# Patient Record
Sex: FEMALE | Race: WHITE | NOT HISPANIC OR LATINO | Employment: FULL TIME | URBAN - NONMETROPOLITAN AREA
[De-identification: names, ages, dates, MRNs, and addresses within clinical notes are randomized per-mention and may not be internally consistent; named-entity substitution may affect disease eponyms.]

---

## 2018-06-13 ENCOUNTER — PREP FOR SURGERY (OUTPATIENT)
Dept: OTHER | Facility: HOSPITAL | Age: 43
End: 2018-06-13

## 2018-06-13 DIAGNOSIS — R10.2 PELVIC PAIN: Primary | ICD-10-CM

## 2018-06-13 RX ORDER — SODIUM CHLORIDE 0.9 % (FLUSH) 0.9 %
1-10 SYRINGE (ML) INJECTION AS NEEDED
Status: CANCELLED | OUTPATIENT
Start: 2018-06-19

## 2018-06-14 ENCOUNTER — APPOINTMENT (OUTPATIENT)
Dept: PREADMISSION TESTING | Facility: HOSPITAL | Age: 43
End: 2018-06-14

## 2018-06-14 DIAGNOSIS — R10.2 PELVIC PAIN: ICD-10-CM

## 2018-06-14 LAB
ABO GROUP BLD: NORMAL
ANION GAP SERPL CALCULATED.3IONS-SCNC: 1.3 MMOL/L (ref 3.6–11.2)
BASOPHILS # BLD AUTO: 0.02 10*3/MM3 (ref 0–0.3)
BASOPHILS NFR BLD AUTO: 0.2 % (ref 0–2)
BLD GP AB SCN SERPL QL: NEGATIVE
BUN BLD-MCNC: 7 MG/DL (ref 7–21)
BUN/CREAT SERPL: 9 (ref 7–25)
CALCIUM SPEC-SCNC: 9.6 MG/DL (ref 7.7–10)
CHLORIDE SERPL-SCNC: 110 MMOL/L (ref 99–112)
CO2 SERPL-SCNC: 29.7 MMOL/L (ref 24.3–31.9)
CREAT BLD-MCNC: 0.78 MG/DL (ref 0.43–1.29)
DEPRECATED RDW RBC AUTO: 49.8 FL (ref 37–54)
EOSINOPHIL # BLD AUTO: 0.04 10*3/MM3 (ref 0–0.7)
EOSINOPHIL NFR BLD AUTO: 0.4 % (ref 0–5)
ERYTHROCYTE [DISTWIDTH] IN BLOOD BY AUTOMATED COUNT: 14.9 % (ref 11.5–14.5)
GFR SERPL CREATININE-BSD FRML MDRD: 81 ML/MIN/1.73
GLUCOSE BLD-MCNC: 98 MG/DL (ref 70–110)
HCT VFR BLD AUTO: 45.3 % (ref 37–47)
HGB BLD-MCNC: 15.8 G/DL (ref 12–16)
IMM GRANULOCYTES # BLD: 0.02 10*3/MM3 (ref 0–0.03)
IMM GRANULOCYTES NFR BLD: 0.2 % (ref 0–0.5)
LYMPHOCYTES # BLD AUTO: 1.68 10*3/MM3 (ref 1–3)
LYMPHOCYTES NFR BLD AUTO: 18.1 % (ref 21–51)
MCH RBC QN AUTO: 33 PG (ref 27–33)
MCHC RBC AUTO-ENTMCNC: 34.9 G/DL (ref 33–37)
MCV RBC AUTO: 94.6 FL (ref 80–94)
MONOCYTES # BLD AUTO: 0.67 10*3/MM3 (ref 0.1–0.9)
MONOCYTES NFR BLD AUTO: 7.2 % (ref 0–10)
NEUTROPHILS # BLD AUTO: 6.86 10*3/MM3 (ref 1.4–6.5)
NEUTROPHILS NFR BLD AUTO: 73.9 % (ref 30–70)
OSMOLALITY SERPL CALC.SUM OF ELEC: 279.2 MOSM/KG (ref 273–305)
PLATELET # BLD AUTO: 220 10*3/MM3 (ref 130–400)
PMV BLD AUTO: 11 FL (ref 6–10)
POTASSIUM BLD-SCNC: 4.2 MMOL/L (ref 3.5–5.3)
RBC # BLD AUTO: 4.79 10*6/MM3 (ref 4.2–5.4)
RH BLD: POSITIVE
SODIUM BLD-SCNC: 141 MMOL/L (ref 135–153)
T&S EXPIRATION DATE: NORMAL
WBC NRBC COR # BLD: 9.29 10*3/MM3 (ref 4.5–12.5)

## 2018-06-14 PROCEDURE — 85025 COMPLETE CBC W/AUTO DIFF WBC: CPT | Performed by: OBSTETRICS & GYNECOLOGY

## 2018-06-14 PROCEDURE — 86850 RBC ANTIBODY SCREEN: CPT | Performed by: OBSTETRICS & GYNECOLOGY

## 2018-06-14 PROCEDURE — 86901 BLOOD TYPING SEROLOGIC RH(D): CPT | Performed by: OBSTETRICS & GYNECOLOGY

## 2018-06-14 PROCEDURE — 36415 COLL VENOUS BLD VENIPUNCTURE: CPT

## 2018-06-14 PROCEDURE — 80048 BASIC METABOLIC PNL TOTAL CA: CPT | Performed by: OBSTETRICS & GYNECOLOGY

## 2018-06-14 PROCEDURE — 86900 BLOOD TYPING SEROLOGIC ABO: CPT | Performed by: OBSTETRICS & GYNECOLOGY

## 2018-06-14 RX ORDER — CITALOPRAM 20 MG/1
20 TABLET ORAL DAILY
COMMUNITY

## 2018-06-14 RX ORDER — IBUPROFEN 600 MG/1
600 TABLET ORAL EVERY 6 HOURS PRN
COMMUNITY

## 2018-06-14 RX ORDER — GABAPENTIN 600 MG/1
600 TABLET ORAL 3 TIMES DAILY
COMMUNITY

## 2018-06-14 RX ORDER — SUMATRIPTAN 100 MG/1
100 TABLET, FILM COATED ORAL
COMMUNITY

## 2018-06-14 RX ORDER — TOPIRAMATE 100 MG/1
200 TABLET, FILM COATED ORAL DAILY
COMMUNITY

## 2018-06-14 RX ORDER — ALPRAZOLAM 1 MG/1
1 TABLET ORAL 2 TIMES DAILY PRN
COMMUNITY

## 2018-06-19 ENCOUNTER — APPOINTMENT (OUTPATIENT)
Dept: GENERAL RADIOLOGY | Facility: HOSPITAL | Age: 43
End: 2018-06-19
Attending: OBSTETRICS & GYNECOLOGY

## 2018-06-19 ENCOUNTER — HOSPITAL ENCOUNTER (OUTPATIENT)
Facility: HOSPITAL | Age: 43
Setting detail: HOSPITAL OUTPATIENT SURGERY
Discharge: HOME | End: 2018-06-19
Attending: OBSTETRICS & GYNECOLOGY | Admitting: OBSTETRICS & GYNECOLOGY

## 2018-06-19 ENCOUNTER — ANESTHESIA (OUTPATIENT)
Dept: PERIOP | Facility: HOSPITAL | Age: 43
End: 2018-06-19

## 2018-06-19 ENCOUNTER — ANESTHESIA EVENT (OUTPATIENT)
Dept: PERIOP | Facility: HOSPITAL | Age: 43
End: 2018-06-19

## 2018-06-19 VITALS
DIASTOLIC BLOOD PRESSURE: 76 MMHG | OXYGEN SATURATION: 99 % | TEMPERATURE: 98 F | HEIGHT: 62 IN | SYSTOLIC BLOOD PRESSURE: 120 MMHG | RESPIRATION RATE: 16 BRPM | HEART RATE: 74 BPM | WEIGHT: 113 LBS | BODY MASS INDEX: 20.8 KG/M2

## 2018-06-19 DIAGNOSIS — R10.2 PELVIC PAIN: ICD-10-CM

## 2018-06-19 PROCEDURE — 25010000002 FENTANYL CITRATE (PF) 100 MCG/2ML SOLUTION: Performed by: NURSE ANESTHETIST, CERTIFIED REGISTERED

## 2018-06-19 PROCEDURE — 25010000002 PROPOFOL 10 MG/ML EMULSION: Performed by: NURSE ANESTHETIST, CERTIFIED REGISTERED

## 2018-06-19 PROCEDURE — 25010000002 DEXAMETHASONE PER 1 MG: Performed by: NURSE ANESTHETIST, CERTIFIED REGISTERED

## 2018-06-19 PROCEDURE — 25010000002 ONDANSETRON PER 1 MG: Performed by: NURSE ANESTHETIST, CERTIFIED REGISTERED

## 2018-06-19 PROCEDURE — 25010000002 NEOSTIGMINE 10 MG/10ML SOLUTION: Performed by: NURSE ANESTHETIST, CERTIFIED REGISTERED

## 2018-06-19 PROCEDURE — 25010000002 MIDAZOLAM PER 1 MG: Performed by: NURSE ANESTHETIST, CERTIFIED REGISTERED

## 2018-06-19 RX ORDER — GLYCOPYRROLATE 0.2 MG/ML
INJECTION INTRAMUSCULAR; INTRAVENOUS AS NEEDED
Status: DISCONTINUED | OUTPATIENT
Start: 2018-06-19 | End: 2018-06-19 | Stop reason: SURG

## 2018-06-19 RX ORDER — IPRATROPIUM BROMIDE AND ALBUTEROL SULFATE 2.5; .5 MG/3ML; MG/3ML
3 SOLUTION RESPIRATORY (INHALATION) ONCE AS NEEDED
Status: DISCONTINUED | OUTPATIENT
Start: 2018-06-19 | End: 2018-06-19 | Stop reason: HOSPADM

## 2018-06-19 RX ORDER — FAMOTIDINE 10 MG/ML
INJECTION, SOLUTION INTRAVENOUS AS NEEDED
Status: DISCONTINUED | OUTPATIENT
Start: 2018-06-19 | End: 2018-06-19 | Stop reason: SURG

## 2018-06-19 RX ORDER — LIDOCAINE HYDROCHLORIDE AND EPINEPHRINE 10; 10 MG/ML; UG/ML
INJECTION, SOLUTION INFILTRATION; PERINEURAL AS NEEDED
Status: DISCONTINUED | OUTPATIENT
Start: 2018-06-19 | End: 2018-06-19 | Stop reason: HOSPADM

## 2018-06-19 RX ORDER — SODIUM CHLORIDE 0.9 % (FLUSH) 0.9 %
1-10 SYRINGE (ML) INJECTION AS NEEDED
Status: DISCONTINUED | OUTPATIENT
Start: 2018-06-19 | End: 2018-06-19 | Stop reason: HOSPADM

## 2018-06-19 RX ORDER — LIDOCAINE HYDROCHLORIDE 20 MG/ML
INJECTION, SOLUTION INFILTRATION; PERINEURAL AS NEEDED
Status: DISCONTINUED | OUTPATIENT
Start: 2018-06-19 | End: 2018-06-19 | Stop reason: SURG

## 2018-06-19 RX ORDER — SODIUM CHLORIDE, SODIUM LACTATE, POTASSIUM CHLORIDE, CALCIUM CHLORIDE 600; 310; 30; 20 MG/100ML; MG/100ML; MG/100ML; MG/100ML
125 INJECTION, SOLUTION INTRAVENOUS CONTINUOUS
Status: DISCONTINUED | OUTPATIENT
Start: 2018-06-19 | End: 2018-06-19 | Stop reason: HOSPADM

## 2018-06-19 RX ORDER — FENTANYL CITRATE 50 UG/ML
INJECTION, SOLUTION INTRAMUSCULAR; INTRAVENOUS AS NEEDED
Status: DISCONTINUED | OUTPATIENT
Start: 2018-06-19 | End: 2018-06-19 | Stop reason: SURG

## 2018-06-19 RX ORDER — MAGNESIUM HYDROXIDE 1200 MG/15ML
LIQUID ORAL AS NEEDED
Status: DISCONTINUED | OUTPATIENT
Start: 2018-06-19 | End: 2018-06-19 | Stop reason: HOSPADM

## 2018-06-19 RX ORDER — FENTANYL CITRATE 50 UG/ML
50 INJECTION, SOLUTION INTRAMUSCULAR; INTRAVENOUS
Status: DISCONTINUED | OUTPATIENT
Start: 2018-06-19 | End: 2018-06-19 | Stop reason: HOSPADM

## 2018-06-19 RX ORDER — IBUPROFEN 600 MG/1
600 TABLET ORAL EVERY 6 HOURS PRN
Qty: 30 TABLET | Refills: 0 | Status: SHIPPED | OUTPATIENT
Start: 2018-06-19 | End: 2018-07-19

## 2018-06-19 RX ORDER — ONDANSETRON 2 MG/ML
4 INJECTION INTRAMUSCULAR; INTRAVENOUS ONCE AS NEEDED
Status: DISCONTINUED | OUTPATIENT
Start: 2018-06-19 | End: 2018-06-19 | Stop reason: HOSPADM

## 2018-06-19 RX ORDER — MIDAZOLAM HYDROCHLORIDE 1 MG/ML
INJECTION INTRAMUSCULAR; INTRAVENOUS AS NEEDED
Status: DISCONTINUED | OUTPATIENT
Start: 2018-06-19 | End: 2018-06-19 | Stop reason: SURG

## 2018-06-19 RX ORDER — DOCUSATE SODIUM 100 MG/1
100 CAPSULE, LIQUID FILLED ORAL 2 TIMES DAILY
Qty: 60 CAPSULE | Refills: 0 | Status: SHIPPED | OUTPATIENT
Start: 2018-06-19 | End: 2018-07-19

## 2018-06-19 RX ORDER — ROCURONIUM BROMIDE 10 MG/ML
INJECTION, SOLUTION INTRAVENOUS AS NEEDED
Status: DISCONTINUED | OUTPATIENT
Start: 2018-06-19 | End: 2018-06-19 | Stop reason: SURG

## 2018-06-19 RX ORDER — OXYCODONE HYDROCHLORIDE AND ACETAMINOPHEN 5; 325 MG/1; MG/1
1 TABLET ORAL EVERY 4 HOURS PRN
Qty: 40 TABLET | Refills: 0 | Status: ON HOLD | OUTPATIENT
Start: 2018-06-19 | End: 2018-08-28 | Stop reason: SDUPTHER

## 2018-06-19 RX ORDER — NEOSTIGMINE METHYLSULFATE 1 MG/ML
INJECTION, SOLUTION INTRAVENOUS AS NEEDED
Status: DISCONTINUED | OUTPATIENT
Start: 2018-06-19 | End: 2018-06-19 | Stop reason: SURG

## 2018-06-19 RX ORDER — PROPOFOL 10 MG/ML
VIAL (ML) INTRAVENOUS AS NEEDED
Status: DISCONTINUED | OUTPATIENT
Start: 2018-06-19 | End: 2018-06-19 | Stop reason: SURG

## 2018-06-19 RX ORDER — SODIUM CHLORIDE 9 MG/ML
INJECTION, SOLUTION INTRAVENOUS AS NEEDED
Status: DISCONTINUED | OUTPATIENT
Start: 2018-06-19 | End: 2018-06-19 | Stop reason: HOSPADM

## 2018-06-19 RX ORDER — ONDANSETRON 2 MG/ML
INJECTION INTRAMUSCULAR; INTRAVENOUS AS NEEDED
Status: DISCONTINUED | OUTPATIENT
Start: 2018-06-19 | End: 2018-06-19 | Stop reason: SURG

## 2018-06-19 RX ORDER — OXYCODONE HYDROCHLORIDE AND ACETAMINOPHEN 5; 325 MG/1; MG/1
1 TABLET ORAL ONCE AS NEEDED
Status: DISCONTINUED | OUTPATIENT
Start: 2018-06-19 | End: 2018-06-19 | Stop reason: HOSPADM

## 2018-06-19 RX ORDER — MEPERIDINE HYDROCHLORIDE 50 MG/ML
12.5 INJECTION INTRAMUSCULAR; INTRAVENOUS; SUBCUTANEOUS
Status: COMPLETED | OUTPATIENT
Start: 2018-06-19 | End: 2018-06-19

## 2018-06-19 RX ORDER — DEXAMETHASONE SODIUM PHOSPHATE 4 MG/ML
INJECTION, SOLUTION INTRA-ARTICULAR; INTRALESIONAL; INTRAMUSCULAR; INTRAVENOUS; SOFT TISSUE AS NEEDED
Status: DISCONTINUED | OUTPATIENT
Start: 2018-06-19 | End: 2018-06-19 | Stop reason: SURG

## 2018-06-19 RX ADMIN — SODIUM CHLORIDE, POTASSIUM CHLORIDE, SODIUM LACTATE AND CALCIUM CHLORIDE 125 ML/HR: 600; 310; 30; 20 INJECTION, SOLUTION INTRAVENOUS at 09:53

## 2018-06-19 RX ADMIN — DEXAMETHASONE SODIUM PHOSPHATE 8 MG: 4 INJECTION, SOLUTION INTRAMUSCULAR; INTRAVENOUS at 10:09

## 2018-06-19 RX ADMIN — MIDAZOLAM HYDROCHLORIDE 2 MG: 1 INJECTION, SOLUTION INTRAMUSCULAR; INTRAVENOUS at 10:05

## 2018-06-19 RX ADMIN — FAMOTIDINE 20 MG: 10 INJECTION, SOLUTION INTRAVENOUS at 10:05

## 2018-06-19 RX ADMIN — ROCURONIUM BROMIDE 20 MG: 10 INJECTION INTRAVENOUS at 10:09

## 2018-06-19 RX ADMIN — ONDANSETRON 4 MG: 2 INJECTION, SOLUTION INTRAMUSCULAR; INTRAVENOUS at 10:09

## 2018-06-19 RX ADMIN — GLYCOPYRROLATE 0.6 MG: 0.2 INJECTION, SOLUTION INTRAMUSCULAR; INTRAVENOUS at 10:36

## 2018-06-19 RX ADMIN — FENTANYL CITRATE 100 MCG: 50 INJECTION INTRAMUSCULAR; INTRAVENOUS at 10:05

## 2018-06-19 RX ADMIN — SODIUM CHLORIDE, POTASSIUM CHLORIDE, SODIUM LACTATE AND CALCIUM CHLORIDE: 600; 310; 30; 20 INJECTION, SOLUTION INTRAVENOUS at 10:37

## 2018-06-19 RX ADMIN — FENTANYL CITRATE 100 MCG: 50 INJECTION INTRAMUSCULAR; INTRAVENOUS at 10:36

## 2018-06-19 RX ADMIN — MEPERIDINE HYDROCHLORIDE 12.5 MG: 50 INJECTION INTRAMUSCULAR; INTRAVENOUS; SUBCUTANEOUS at 11:00

## 2018-06-19 RX ADMIN — FENTANYL CITRATE 50 MCG: 50 INJECTION INTRAMUSCULAR; INTRAVENOUS at 10:56

## 2018-06-19 RX ADMIN — FENTANYL CITRATE 50 MCG: 50 INJECTION INTRAMUSCULAR; INTRAVENOUS at 11:05

## 2018-06-19 RX ADMIN — PROPOFOL 150 MG: 10 INJECTION, EMULSION INTRAVENOUS at 10:09

## 2018-06-19 RX ADMIN — NEOSTIGMINE METHYLSULFATE 3 MG: 1 INJECTION, SOLUTION INTRAVENOUS at 10:36

## 2018-06-19 RX ADMIN — LIDOCAINE HYDROCHLORIDE 40 MG: 20 INJECTION, SOLUTION INFILTRATION; PERINEURAL at 10:05

## 2018-06-19 RX ADMIN — MEPERIDINE HYDROCHLORIDE 12.5 MG: 50 INJECTION INTRAMUSCULAR; INTRAVENOUS; SUBCUTANEOUS at 10:51

## 2018-06-19 NOTE — ANESTHESIA PREPROCEDURE EVALUATION
Anesthesia Evaluation     Patient summary reviewed and Nursing notes reviewed   no history of anesthetic complications:  NPO Solid Status: > 8 hours  NPO Liquid Status: > 8 hours           Airway   Mallampati: II  TM distance: >3 FB  Neck ROM: full  no difficulty expected  Dental - normal exam   (+) edentulous, lower dentures and upper dentures    Pulmonary - normal exam   (+) a smoker Current Smoked day of surgery,   Cardiovascular - negative cardio ROS and normal exam  Exercise tolerance: good (4-7 METS)    NYHA Classification: II        Neuro/Psych  (+) headaches, psychiatric history Anxiety and Depression,     GI/Hepatic/Renal/Endo - negative ROS     Musculoskeletal (-) negative ROS    Abdominal  - normal exam    Bowel sounds: normal.   Substance History - negative use     OB/GYN negative ob/gyn ROS         Other - negative ROS                       Anesthesia Plan    ASA 2     general     intravenous induction   Anesthetic plan and risks discussed with patient.  Use of blood products discussed with patient  Consented to blood products.

## 2018-06-19 NOTE — ANESTHESIA POSTPROCEDURE EVALUATION
Patient: Gillian Maria    Procedure Summary     Date:  06/19/18 Room / Location:   [facility] 04 /  [facility]    Anesthesia Start:  1005 Anesthesia Stop:  1051    Procedure:  LAPAROSCOPIC LEFT OOPHORECTOMY, RIGHT SALPINGO OOPHERECTOMY, CAUTERIZATION OF ENDOMETRIOSIS (Bilateral Abdomen) Diagnosis:  (R10.2)    Surgeon:  Armando Johnson MD Provider:  Nick Graham MD    Anesthesia Type:  general ASA Status:  2          Anesthesia Type: general  Last vitals  BP   117/74 (06/19/18 1126)   Temp   98 °F (36.7 °C) (06/19/18 1126)   Pulse   73 (06/19/18 1126)   Resp   16 (06/19/18 1126)     SpO2   99 % (06/19/18 1126)     Anesthesia Post Evaluation

## 2018-06-19 NOTE — ANESTHESIA PROCEDURE NOTES
Airway  Urgency: elective    Date/Time: 6/19/2018 10:09 AM  Airway not difficult    General Information and Staff    Patient location during procedure: OR  Anesthesiologist: IFRAH THOMAS  CRNA: RONNIE CHAVEZ    Indications and Patient Condition  Indications for airway management: airway protection    Preoxygenated: yes  MILS maintained throughout  Mask difficulty assessment: 2 - vent by mask + OA or adjuvant +/- NMBA    Final Airway Details  Final airway type: endotracheal airway      Successful airway: ETT  Cuffed: yes   Successful intubation technique: direct laryngoscopy  Facilitating devices/methods: intubating stylet  Endotracheal tube insertion site: oral  Blade: Alyssia  Blade size: #3  ETT size: 7.5 mm  Cormack-Lehane Classification: grade IIa - partial view of glottis  Placement verified by: chest auscultation, capnometry and palpation of cuff   Cuff volume (mL): 6  Measured from: lips  ETT to lips (cm): 22  Number of attempts at approach: 1    Additional Comments  Dentition as preop. No complications noted. Patient tolerated well.  ETT secured.

## 2018-06-19 NOTE — H&P
HISTORY    Chief complaint  Pelvic pain    History of present illness  This is a 43 year old  patient who has had a hysterectomy.  She presented with pelvic pain.  She is been having this now for about 5 months, and the pain is slowly getting worse.  It is located in the right lower quadrant and she has no bowel symptoms or urinary tract symptoms to make one think that her pain is related to those organ systems.  She is also having dyspareunia.  She had her hysterectomy done for heavy vaginal bleeding in [facility] and it was done laparoscopically.  She has decided to undergo BSO, laparoscopic.    Past medical, surgical, obstetrical history  NAD; she had the above-mentioned hysterectomy, she has back pain, she has had a breast biopsy.  She is taking alprazolam, topiramate, gabapentin, ibuprofen, and citalopram.    Family history  NAD    Social history  NAD    Functional inquiry  NAD                             PHYSICAL EXAMINATION    General  In no acute distress    HEENT  Throat and ears are clear, no masses or nodes were palpable    CVR  Heart sounds are normal with no murmurs, chest is clear to IPPA    GI/  Abdomen is soft with no masses tenderness or organomegaly.  Pelvic examination reveals tenderness in both adnexal areas.  Ultrasound showed a right ovarian cyst.    MS  No gross bone or joint abnormalities.                                        IMPRESSION  Pelvic pain, ovarian retention syndrome                                    TREATMENT PLAN    Laparoscopic BSO    I explained that when we do a laparoscopy, we use a small instrument that looks like a pen, with a sharp end on it, and a camera inside of it.  We make a very small incision in the abdomen, and we use this instrument to enter the abdominal cavity under direct vision.  We then fill her up with carbon dioxide.  We will then put in extra instruments if necessary.  She understands that when we  do this, there is the very small chance of perforation of vessel or viscus, but that is an unavoidable risk and having any problems occur at this point is very rare.  Of course, if it did, we would need to operate to repair any damage.  She understands and accepts this.

## 2018-06-19 NOTE — OP NOTE
OPERATIVE NOTE    Preoperative diagnosis: Pelvic pain    Postoperative diagnosis: Same as above, plus pelvic adhesive disease, endometriosis    Procedure performed: Diagnostic laparoscopy with cautery of endometriosis, right salpingo-oophorectomy, left oophrectomy, lysis of adhesions    Specimens removed: Right tube and ovary, left ovary, possible left fallopian tube    Anesthesia: General    Surgeon: Dr. Armando Johnson    Assistant: None    Estimated blood loss: 15 cc    Blood products: None    Grafts/implants: None    Complications: None    Description of the procedure:This patient was taken to the operating room, placed on the operating table in the supine position, and prepped and draped in usual fashion for laparoscopic surgery.  A Hernandez catheter was placed in her bladder.  Following this, a 5 mm Optiview trocar was used to enter through the umbilicus.  Insufflation was accomplished, and a 5 mm trocar was placed in the left lower quadrant and a 10 mm trocar in the right lower quadrant.    We found the following; this patient had pelvic adhesive disease.  She had adhesions from her right tube and ovary to the cul-de-sac and sidewall and also she had adhesions from her left ovary to the left pelvic sidewall.  I'm not sure she had a left fallopian tube or not.  We did see left ovarian tissue.  She had spots of endometriosis, typical endometriosis on the right posterior cul-de-sac in 2 places, and some red endometriosis on the left side in the anterior cul-de-sac.  These were all cauterized.  The individual pelvic ligament on both sides was cauterized and divided.  The right tube and ovary were removed and so was the left adnexal structure.  Adhesions were lysed.  The appendix looked normal, as did the liver edge and gallbladder.    All fascial incisions over 8 mm were closed with Vicryl, and the rest of her incisions were closed, after evacuation of CO2, with subcuticular Vicryl and Steri-Strips.  All instruments and  the catheter were removed and the patient was returned to the post anesthetic recovery room in satisfactory condition with clear urine.    Significant points about this procedure: I went to talk to the family but they were not there.  I told the nurse to give them a masses that she was doing well and we would see her in the office in 2 weeks.

## 2018-06-21 LAB
LAB AP CASE REPORT: NORMAL
PATH REPORT.FINAL DX SPEC: NORMAL

## 2018-08-28 ENCOUNTER — HOSPITAL ENCOUNTER (OUTPATIENT)
Facility: HOSPITAL | Age: 43
Setting detail: HOSPITAL OUTPATIENT SURGERY
Discharge: HOME | End: 2018-08-28
Attending: OBSTETRICS & GYNECOLOGY | Admitting: OBSTETRICS & GYNECOLOGY

## 2018-08-28 ENCOUNTER — ANESTHESIA EVENT (OUTPATIENT)
Dept: PERIOP | Facility: HOSPITAL | Age: 43
End: 2018-08-28

## 2018-08-28 ENCOUNTER — ANESTHESIA (OUTPATIENT)
Dept: PERIOP | Facility: HOSPITAL | Age: 43
End: 2018-08-28

## 2018-08-28 ENCOUNTER — APPOINTMENT (OUTPATIENT)
Dept: PREADMISSION TESTING | Facility: HOSPITAL | Age: 43
End: 2018-08-28

## 2018-08-28 VITALS
HEART RATE: 77 BPM | BODY MASS INDEX: 20.06 KG/M2 | HEIGHT: 62 IN | SYSTOLIC BLOOD PRESSURE: 114 MMHG | DIASTOLIC BLOOD PRESSURE: 72 MMHG | RESPIRATION RATE: 16 BRPM | OXYGEN SATURATION: 99 % | TEMPERATURE: 98.5 F | WEIGHT: 109 LBS

## 2018-08-28 DIAGNOSIS — N75.1 BARTHOLIN'S GLAND ABSCESS: Primary | ICD-10-CM

## 2018-08-28 DIAGNOSIS — N75.0 BARTHOLIN CYST: ICD-10-CM

## 2018-08-28 PROCEDURE — 87070 CULTURE OTHR SPECIMN AEROBIC: CPT | Performed by: OBSTETRICS & GYNECOLOGY

## 2018-08-28 PROCEDURE — 87077 CULTURE AEROBIC IDENTIFY: CPT | Performed by: OBSTETRICS & GYNECOLOGY

## 2018-08-28 PROCEDURE — 87205 SMEAR GRAM STAIN: CPT | Performed by: OBSTETRICS & GYNECOLOGY

## 2018-08-28 PROCEDURE — 25010000002 MIDAZOLAM PER 1 MG: Performed by: NURSE ANESTHETIST, CERTIFIED REGISTERED

## 2018-08-28 PROCEDURE — 25010000002 ONDANSETRON PER 1 MG: Performed by: NURSE ANESTHETIST, CERTIFIED REGISTERED

## 2018-08-28 PROCEDURE — 25010000002 PROPOFOL 10 MG/ML EMULSION: Performed by: NURSE ANESTHETIST, CERTIFIED REGISTERED

## 2018-08-28 PROCEDURE — 87185 SC STD ENZYME DETCJ PER NZM: CPT | Performed by: OBSTETRICS & GYNECOLOGY

## 2018-08-28 PROCEDURE — 25010000002 FENTANYL CITRATE (PF) 100 MCG/2ML SOLUTION: Performed by: NURSE ANESTHETIST, CERTIFIED REGISTERED

## 2018-08-28 PROCEDURE — 25010000002 DEXAMETHASONE PER 1 MG: Performed by: NURSE ANESTHETIST, CERTIFIED REGISTERED

## 2018-08-28 RX ORDER — SODIUM CHLORIDE 0.9 % (FLUSH) 0.9 %
1-10 SYRINGE (ML) INJECTION AS NEEDED
Status: DISCONTINUED | OUTPATIENT
Start: 2018-08-28 | End: 2018-08-28 | Stop reason: HOSPADM

## 2018-08-28 RX ORDER — IPRATROPIUM BROMIDE AND ALBUTEROL SULFATE 2.5; .5 MG/3ML; MG/3ML
3 SOLUTION RESPIRATORY (INHALATION) ONCE AS NEEDED
Status: DISCONTINUED | OUTPATIENT
Start: 2018-08-28 | End: 2018-08-28 | Stop reason: HOSPADM

## 2018-08-28 RX ORDER — OXYCODONE HYDROCHLORIDE AND ACETAMINOPHEN 5; 325 MG/1; MG/1
1 TABLET ORAL ONCE AS NEEDED
Status: DISCONTINUED | OUTPATIENT
Start: 2018-08-28 | End: 2018-08-28 | Stop reason: HOSPADM

## 2018-08-28 RX ORDER — BUPIVACAINE HYDROCHLORIDE AND EPINEPHRINE 5; 5 MG/ML; UG/ML
INJECTION, SOLUTION PERINEURAL AS NEEDED
Status: DISCONTINUED | OUTPATIENT
Start: 2018-08-28 | End: 2018-08-28 | Stop reason: HOSPADM

## 2018-08-28 RX ORDER — PROPOFOL 10 MG/ML
VIAL (ML) INTRAVENOUS AS NEEDED
Status: DISCONTINUED | OUTPATIENT
Start: 2018-08-28 | End: 2018-08-28 | Stop reason: SURG

## 2018-08-28 RX ORDER — MEPERIDINE HYDROCHLORIDE 50 MG/ML
12.5 INJECTION INTRAMUSCULAR; INTRAVENOUS; SUBCUTANEOUS
Status: DISCONTINUED | OUTPATIENT
Start: 2018-08-28 | End: 2018-08-28 | Stop reason: HOSPADM

## 2018-08-28 RX ORDER — ONDANSETRON 2 MG/ML
4 INJECTION INTRAMUSCULAR; INTRAVENOUS ONCE AS NEEDED
Status: DISCONTINUED | OUTPATIENT
Start: 2018-08-28 | End: 2018-08-28 | Stop reason: HOSPADM

## 2018-08-28 RX ORDER — MIDAZOLAM HYDROCHLORIDE 1 MG/ML
2 INJECTION INTRAMUSCULAR; INTRAVENOUS
Status: DISCONTINUED | OUTPATIENT
Start: 2018-08-28 | End: 2018-08-28 | Stop reason: HOSPADM

## 2018-08-28 RX ORDER — FENTANYL CITRATE 50 UG/ML
INJECTION, SOLUTION INTRAMUSCULAR; INTRAVENOUS AS NEEDED
Status: DISCONTINUED | OUTPATIENT
Start: 2018-08-28 | End: 2018-08-28 | Stop reason: SURG

## 2018-08-28 RX ORDER — LIDOCAINE HYDROCHLORIDE 20 MG/ML
INJECTION, SOLUTION INFILTRATION; PERINEURAL AS NEEDED
Status: DISCONTINUED | OUTPATIENT
Start: 2018-08-28 | End: 2018-08-28 | Stop reason: SURG

## 2018-08-28 RX ORDER — ONDANSETRON 2 MG/ML
INJECTION INTRAMUSCULAR; INTRAVENOUS AS NEEDED
Status: DISCONTINUED | OUTPATIENT
Start: 2018-08-28 | End: 2018-08-28 | Stop reason: SURG

## 2018-08-28 RX ORDER — MAGNESIUM HYDROXIDE 1200 MG/15ML
LIQUID ORAL AS NEEDED
Status: DISCONTINUED | OUTPATIENT
Start: 2018-08-28 | End: 2018-08-28 | Stop reason: HOSPADM

## 2018-08-28 RX ORDER — MIDAZOLAM HYDROCHLORIDE 1 MG/ML
1 INJECTION INTRAMUSCULAR; INTRAVENOUS
Status: DISCONTINUED | OUTPATIENT
Start: 2018-08-28 | End: 2018-08-28 | Stop reason: HOSPADM

## 2018-08-28 RX ORDER — FENTANYL CITRATE 50 UG/ML
50 INJECTION, SOLUTION INTRAMUSCULAR; INTRAVENOUS
Status: DISCONTINUED | OUTPATIENT
Start: 2018-08-28 | End: 2018-08-28 | Stop reason: HOSPADM

## 2018-08-28 RX ORDER — MIDAZOLAM HYDROCHLORIDE 1 MG/ML
INJECTION INTRAMUSCULAR; INTRAVENOUS AS NEEDED
Status: DISCONTINUED | OUTPATIENT
Start: 2018-08-28 | End: 2018-08-28 | Stop reason: SURG

## 2018-08-28 RX ORDER — SODIUM CHLORIDE, SODIUM LACTATE, POTASSIUM CHLORIDE, CALCIUM CHLORIDE 600; 310; 30; 20 MG/100ML; MG/100ML; MG/100ML; MG/100ML
125 INJECTION, SOLUTION INTRAVENOUS CONTINUOUS
Status: DISCONTINUED | OUTPATIENT
Start: 2018-08-28 | End: 2018-08-28 | Stop reason: HOSPADM

## 2018-08-28 RX ORDER — OXYCODONE HYDROCHLORIDE AND ACETAMINOPHEN 5; 325 MG/1; MG/1
1 TABLET ORAL EVERY 4 HOURS PRN
Qty: 40 TABLET | Refills: 0 | Status: SHIPPED | OUTPATIENT
Start: 2018-08-28

## 2018-08-28 RX ORDER — FAMOTIDINE 10 MG/ML
INJECTION, SOLUTION INTRAVENOUS AS NEEDED
Status: DISCONTINUED | OUTPATIENT
Start: 2018-08-28 | End: 2018-08-28 | Stop reason: SURG

## 2018-08-28 RX ORDER — DEXAMETHASONE SODIUM PHOSPHATE 4 MG/ML
INJECTION, SOLUTION INTRA-ARTICULAR; INTRALESIONAL; INTRAMUSCULAR; INTRAVENOUS; SOFT TISSUE AS NEEDED
Status: DISCONTINUED | OUTPATIENT
Start: 2018-08-28 | End: 2018-08-28 | Stop reason: SURG

## 2018-08-28 RX ADMIN — LIDOCAINE HYDROCHLORIDE 40 MG: 20 INJECTION, SOLUTION INFILTRATION; PERINEURAL at 11:44

## 2018-08-28 RX ADMIN — PROPOFOL 150 MG: 10 INJECTION, EMULSION INTRAVENOUS at 11:46

## 2018-08-28 RX ADMIN — FAMOTIDINE 20 MG: 10 INJECTION, SOLUTION INTRAVENOUS at 11:44

## 2018-08-28 RX ADMIN — DEXAMETHASONE SODIUM PHOSPHATE 8 MG: 4 INJECTION, SOLUTION INTRAMUSCULAR; INTRAVENOUS at 11:46

## 2018-08-28 RX ADMIN — ONDANSETRON 4 MG: 2 INJECTION, SOLUTION INTRAMUSCULAR; INTRAVENOUS at 11:46

## 2018-08-28 RX ADMIN — SODIUM CHLORIDE, POTASSIUM CHLORIDE, SODIUM LACTATE AND CALCIUM CHLORIDE: 600; 310; 30; 20 INJECTION, SOLUTION INTRAVENOUS at 11:43

## 2018-08-28 RX ADMIN — FENTANYL CITRATE 100 MCG: 50 INJECTION INTRAMUSCULAR; INTRAVENOUS at 11:44

## 2018-08-28 RX ADMIN — MIDAZOLAM HYDROCHLORIDE 2 MG: 1 INJECTION, SOLUTION INTRAMUSCULAR; INTRAVENOUS at 11:44

## 2018-08-28 NOTE — ANESTHESIA POSTPROCEDURE EVALUATION
Patient: Gillian Maria    Procedure Summary     Date:  08/28/18 Room / Location:   [facility] /  [facility]    Anesthesia Start:  1143 Anesthesia Stop:  1205    Procedure:  BARTHOLIN CYST MARSUPIALIZATION (Left Vulva) Diagnosis:  (N75.1)    Surgeon:  Armando Johnson MD Provider:  Eliot Heath MD    Anesthesia Type:  general ASA Status:  2          Anesthesia Type: general  Last vitals  BP   127/75 (08/28/18 1241)   Temp   98.4 °F (36.9 °C) (08/28/18 1241)   Pulse   76 (08/28/18 1241)   Resp   16 (08/28/18 1241)     SpO2   97 % (08/28/18 1241)     Post Anesthesia Care and Evaluation    Patient location during evaluation: PHASE II  Patient participation: complete - patient participated  Level of consciousness: awake and alert  Pain score: 1  Pain management: adequate  Airway patency: patent  Anesthetic complications: No anesthetic complications  PONV Status: controlled  Cardiovascular status: acceptable  Respiratory status: acceptable  Hydration status: acceptable

## 2018-08-28 NOTE — ANESTHESIA PREPROCEDURE EVALUATION
Anesthesia Evaluation     Patient summary reviewed and Nursing notes reviewed   no history of anesthetic complications:  NPO Solid Status: > 8 hours  NPO Liquid Status: > 8 hours           Airway   Mallampati: II  TM distance: >3 FB  Neck ROM: full  no difficulty expected  Dental - normal exam   (+) edentulous, lower dentures and upper dentures    Pulmonary - normal exam   (+) a smoker Current Smoked day of surgery, asthma,   Cardiovascular - negative cardio ROS and normal exam  Exercise tolerance: good (4-7 METS)    NYHA Classification: II        Neuro/Psych  (+) headaches, psychiatric history Anxiety and Depression,     GI/Hepatic/Renal/Endo - negative ROS     Musculoskeletal (-) negative ROS    Abdominal  - normal exam    Bowel sounds: normal.   Substance History - negative use     OB/GYN negative ob/gyn ROS         Other - negative ROS                         Anesthesia Plan    ASA 2     general     intravenous induction   Anesthetic plan and risks discussed with patient.  Use of blood products discussed with patient  Consented to blood products.

## 2018-08-28 NOTE — ANESTHESIA PROCEDURE NOTES
Airway  Urgency: elective    Date/Time: 8/28/2018 11:46 AM  Airway not difficult    General Information and Staff    Patient location during procedure: OR  Anesthesiologist: CRESENCIO MANCUSO  CRNA: RONNEI CHAVEZ    Indications and Patient Condition  Indications for airway management: airway protection    Preoxygenated: yes  Mask difficulty assessment: 0 - not attempted    Final Airway Details  Final airway type: supraglottic airway      Successful airway: classic  Size 3    Number of attempts at approach: 1    Additional Comments  LMA placed with no trauma noted. Patient tolerated well. Good seal. Secured.

## 2018-08-31 LAB
B-LACTAMASE USUAL SUSC ISLT: NEGATIVE
BACTERIA SPEC AEROBE CULT: ABNORMAL
GRAM STN SPEC: ABNORMAL
GRAM STN SPEC: ABNORMAL

## 2021-09-14 ENCOUNTER — HOSPITAL ENCOUNTER (EMERGENCY)
Facility: HOSPITAL | Age: 46
Discharge: HOME | End: 2021-09-14
Attending: STUDENT IN AN ORGANIZED HEALTH CARE EDUCATION/TRAINING PROGRAM | Admitting: STUDENT IN AN ORGANIZED HEALTH CARE EDUCATION/TRAINING PROGRAM

## 2021-09-14 ENCOUNTER — APPOINTMENT (OUTPATIENT)
Dept: CT IMAGING | Facility: HOSPITAL | Age: 46
End: 2021-09-14

## 2021-09-14 ENCOUNTER — APPOINTMENT (OUTPATIENT)
Dept: GENERAL RADIOLOGY | Facility: HOSPITAL | Age: 46
End: 2021-09-14

## 2021-09-14 VITALS
OXYGEN SATURATION: 99 % | HEART RATE: 90 BPM | WEIGHT: 125 LBS | DIASTOLIC BLOOD PRESSURE: 86 MMHG | BODY MASS INDEX: 23 KG/M2 | RESPIRATION RATE: 16 BRPM | HEIGHT: 62 IN | TEMPERATURE: 99.1 F | SYSTOLIC BLOOD PRESSURE: 121 MMHG

## 2021-09-14 DIAGNOSIS — J98.4 PULMONARY LESION: ICD-10-CM

## 2021-09-14 DIAGNOSIS — U07.1 PNEUMONIA DUE TO COVID-19 VIRUS: Primary | ICD-10-CM

## 2021-09-14 DIAGNOSIS — J12.82 PNEUMONIA DUE TO COVID-19 VIRUS: Primary | ICD-10-CM

## 2021-09-14 LAB
A-A DO2: 28.1 MMHG (ref 0–300)
ALBUMIN SERPL-MCNC: 4.78 G/DL (ref 3.5–5.2)
ALBUMIN/GLOB SERPL: 1.8 G/DL
ALP SERPL-CCNC: 122 U/L (ref 39–117)
ALT SERPL W P-5'-P-CCNC: 28 U/L (ref 1–33)
ANION GAP SERPL CALCULATED.3IONS-SCNC: 13.6 MMOL/L (ref 5–15)
ARTERIAL PATENCY WRIST A: POSITIVE
AST SERPL-CCNC: 28 U/L (ref 1–32)
ATMOSPHERIC PRESS: 728 MMHG
BASE EXCESS BLDA CALC-SCNC: 4.7 MMOL/L (ref 0–2)
BASOPHILS # BLD AUTO: 0.02 10*3/MM3 (ref 0–0.2)
BASOPHILS NFR BLD AUTO: 0.3 % (ref 0–1.5)
BDY SITE: ABNORMAL
BILIRUB SERPL-MCNC: 0.2 MG/DL (ref 0–1.2)
BODY TEMPERATURE: 0 C
BUN SERPL-MCNC: 5 MG/DL (ref 6–20)
BUN/CREAT SERPL: 6 (ref 7–25)
CALCIUM SPEC-SCNC: 10.2 MG/DL (ref 8.6–10.5)
CHLORIDE SERPL-SCNC: 102 MMOL/L (ref 98–107)
CO2 BLDA-SCNC: 28.7 MMOL/L (ref 22–33)
CO2 SERPL-SCNC: 25.4 MMOL/L (ref 22–29)
COHGB MFR BLD: 7.9 % (ref 0–5)
CREAT SERPL-MCNC: 0.84 MG/DL (ref 0.57–1)
CRP SERPL-MCNC: 0.36 MG/DL (ref 0–0.5)
D DIMER PPP FEU-MCNC: 3.54 MCGFEU/ML (ref 0–0.5)
D-LACTATE SERPL-SCNC: 1.9 MMOL/L (ref 0.5–2)
DEPRECATED RDW RBC AUTO: 44.1 FL (ref 37–54)
EOSINOPHIL # BLD AUTO: 0.02 10*3/MM3 (ref 0–0.4)
EOSINOPHIL NFR BLD AUTO: 0.3 % (ref 0.3–6.2)
ERYTHROCYTE [DISTWIDTH] IN BLOOD BY AUTOMATED COUNT: 13.2 % (ref 12.3–15.4)
FERRITIN SERPL-MCNC: 129.2 NG/ML (ref 13–150)
GFR SERPL CREATININE-BSD FRML MDRD: 73 ML/MIN/1.73
GLOBULIN UR ELPH-MCNC: 2.7 GM/DL
GLUCOSE SERPL-MCNC: 166 MG/DL (ref 65–99)
HCO3 BLDA-SCNC: 27.6 MMOL/L (ref 20–26)
HCT VFR BLD AUTO: 45.9 % (ref 34–46.6)
HCT VFR BLD CALC: 48.8 % (ref 38–51)
HGB BLD-MCNC: 16 G/DL (ref 12–15.9)
HGB BLDA-MCNC: 15.9 G/DL (ref 13.5–17.5)
IMM GRANULOCYTES # BLD AUTO: 0.02 10*3/MM3 (ref 0–0.05)
IMM GRANULOCYTES NFR BLD AUTO: 0.3 % (ref 0–0.5)
INHALED O2 CONCENTRATION: 21 %
LDH SERPL-CCNC: 224 U/L (ref 135–214)
LYMPHOCYTES # BLD AUTO: 2.16 10*3/MM3 (ref 0.7–3.1)
LYMPHOCYTES NFR BLD AUTO: 34.9 % (ref 19.6–45.3)
Lab: ABNORMAL
MCH RBC QN AUTO: 31.7 PG (ref 26.6–33)
MCHC RBC AUTO-ENTMCNC: 34.9 G/DL (ref 31.5–35.7)
MCV RBC AUTO: 90.9 FL (ref 79–97)
METHGB BLD QL: 0.1 % (ref 0–3)
MODALITY: ABNORMAL
MONOCYTES # BLD AUTO: 0.61 10*3/MM3 (ref 0.1–0.9)
MONOCYTES NFR BLD AUTO: 9.9 % (ref 5–12)
NEUTROPHILS NFR BLD AUTO: 3.36 10*3/MM3 (ref 1.7–7)
NEUTROPHILS NFR BLD AUTO: 54.3 % (ref 42.7–76)
NOTE: ABNORMAL
NRBC BLD AUTO-RTO: 0 /100 WBC (ref 0–0.2)
OXYHGB MFR BLDV: 89.5 % (ref 94–99)
PCO2 BLDA: 35 MM HG (ref 35–45)
PCO2 TEMP ADJ BLD: ABNORMAL MM[HG]
PH BLDA: 7.5 PH UNITS (ref 7.35–7.45)
PH, TEMP CORRECTED: ABNORMAL
PLATELET # BLD AUTO: 178 10*3/MM3 (ref 140–450)
PMV BLD AUTO: 10.2 FL (ref 6–12)
PO2 BLDA: 75.4 MM HG (ref 83–108)
PO2 TEMP ADJ BLD: ABNORMAL MM[HG]
POTASSIUM SERPL-SCNC: 3.2 MMOL/L (ref 3.5–5.2)
PROT SERPL-MCNC: 7.5 G/DL (ref 6–8.5)
RBC # BLD AUTO: 5.05 10*6/MM3 (ref 3.77–5.28)
SAO2 % BLDCOA: 97.3 % (ref 94–99)
SODIUM SERPL-SCNC: 141 MMOL/L (ref 136–145)
VENTILATOR MODE: ABNORMAL
WBC # BLD AUTO: 6.19 10*3/MM3 (ref 3.4–10.8)

## 2021-09-14 PROCEDURE — 71275 CT ANGIOGRAPHY CHEST: CPT

## 2021-09-14 PROCEDURE — 84145 PROCALCITONIN (PCT): CPT | Performed by: PHYSICIAN ASSISTANT

## 2021-09-14 PROCEDURE — 83605 ASSAY OF LACTIC ACID: CPT | Performed by: PHYSICIAN ASSISTANT

## 2021-09-14 PROCEDURE — 71275 CT ANGIOGRAPHY CHEST: CPT | Performed by: RADIOLOGY

## 2021-09-14 PROCEDURE — 0 IOPAMIDOL PER 1 ML: Performed by: STUDENT IN AN ORGANIZED HEALTH CARE EDUCATION/TRAINING PROGRAM

## 2021-09-14 PROCEDURE — 93010 ELECTROCARDIOGRAM REPORT: CPT | Performed by: INTERNAL MEDICINE

## 2021-09-14 PROCEDURE — 85379 FIBRIN DEGRADATION QUANT: CPT | Performed by: PHYSICIAN ASSISTANT

## 2021-09-14 PROCEDURE — 99283 EMERGENCY DEPT VISIT LOW MDM: CPT

## 2021-09-14 PROCEDURE — 82728 ASSAY OF FERRITIN: CPT | Performed by: PHYSICIAN ASSISTANT

## 2021-09-14 PROCEDURE — 83050 HGB METHEMOGLOBIN QUAN: CPT

## 2021-09-14 PROCEDURE — 86140 C-REACTIVE PROTEIN: CPT | Performed by: PHYSICIAN ASSISTANT

## 2021-09-14 PROCEDURE — 80053 COMPREHEN METABOLIC PANEL: CPT | Performed by: PHYSICIAN ASSISTANT

## 2021-09-14 PROCEDURE — 85025 COMPLETE CBC W/AUTO DIFF WBC: CPT | Performed by: PHYSICIAN ASSISTANT

## 2021-09-14 PROCEDURE — 83615 LACTATE (LD) (LDH) ENZYME: CPT | Performed by: PHYSICIAN ASSISTANT

## 2021-09-14 PROCEDURE — 71045 X-RAY EXAM CHEST 1 VIEW: CPT

## 2021-09-14 PROCEDURE — 71045 X-RAY EXAM CHEST 1 VIEW: CPT | Performed by: RADIOLOGY

## 2021-09-14 PROCEDURE — 93005 ELECTROCARDIOGRAM TRACING: CPT | Performed by: PHYSICIAN ASSISTANT

## 2021-09-14 PROCEDURE — 82375 ASSAY CARBOXYHB QUANT: CPT

## 2021-09-14 PROCEDURE — 82805 BLOOD GASES W/O2 SATURATION: CPT

## 2021-09-14 PROCEDURE — 36600 WITHDRAWAL OF ARTERIAL BLOOD: CPT

## 2021-09-14 RX ORDER — SODIUM CHLORIDE 0.9 % (FLUSH) 0.9 %
10 SYRINGE (ML) INJECTION AS NEEDED
Status: DISCONTINUED | OUTPATIENT
Start: 2021-09-14 | End: 2021-09-14 | Stop reason: HOSPADM

## 2021-09-14 RX ADMIN — SODIUM CHLORIDE 500 ML: 9 INJECTION, SOLUTION INTRAVENOUS at 13:25

## 2021-09-14 RX ADMIN — IOPAMIDOL 85 ML: 755 INJECTION, SOLUTION INTRAVENOUS at 15:35

## 2021-09-14 NOTE — ED PROVIDER NOTES
Subjective   46-year-old female who presents to the emergency department chief complaint 5-day history of shortness of breath, left-sided chest pain.  Patient does report she has history of anxiety, depression.  She denies previous history of coronary artery disease, lung disease, nausea, vomiting.  Patient does states she was recently diagnosed with Covid 19 5 days ago.  She denies any other associated complaints at this time.      History provided by:  Patient   used: No    Shortness of Breath  Severity:  Moderate  Onset quality:  Gradual  Duration:  3 days  Timing:  Intermittent  Progression:  Worsening  Chronicity:  New  Context: not activity, not animal exposure, not fumes, not known allergens, not occupational exposure, not pollens, not smoke exposure, not strong odors, not URI and not weather changes    Relieved by:  Nothing  Worsened by:  Nothing  Ineffective treatments:  None tried  Associated symptoms: chest pain and cough    Associated symptoms: no diaphoresis, no fever, no headaches, no hemoptysis, no PND, no rash, no sputum production, no syncope, no swollen glands and no vomiting    Risk factors: no recent alcohol use, no family hx of DVT, no hx of cancer, no hx of PE/DVT, no obesity, no oral contraceptive use, no prolonged immobilization, no recent surgery and no tobacco use        Review of Systems   Constitutional: Negative.  Negative for appetite change, diaphoresis, fatigue and fever.   HENT: Negative.  Negative for drooling, ear discharge, facial swelling, mouth sores, nosebleeds, postnasal drip and rhinorrhea.    Eyes: Negative.    Respiratory: Positive for cough, chest tightness and shortness of breath. Negative for hemoptysis and sputum production.    Cardiovascular: Positive for chest pain. Negative for palpitations, leg swelling, syncope and PND.   Gastrointestinal: Negative for vomiting.   Endocrine: Negative.  Negative for cold intolerance, heat intolerance, polydipsia  and polyphagia.   Genitourinary: Negative.  Negative for difficulty urinating, flank pain, frequency, genital sores, hematuria, menstrual problem, pelvic pain and urgency.   Musculoskeletal: Negative.  Negative for back pain, gait problem, joint swelling and myalgias.   Skin: Negative.  Negative for color change, pallor and rash.   Neurological: Negative.  Negative for seizures, light-headedness, numbness and headaches.   Hematological: Negative.    Psychiatric/Behavioral: Negative.  Negative for agitation, behavioral problems, confusion, decreased concentration, dysphoric mood and hallucinations.   All other systems reviewed and are negative.      Past Medical History:   Diagnosis Date   • Anemia    • Anxiety and depression    • Bartholin cyst    • History of transfusion    • Migraines    • Pelvic pain        Allergies   Allergen Reactions   • Penicillins Hives       Past Surgical History:   Procedure Laterality Date   • BARTHOLIN CYST MARSUPIALIZATION Left 8/28/2018    Procedure: BARTHOLIN CYST MARSUPIALIZATION;  Surgeon: Armando Johnson MD;  Location: [facility];  Service: Obstetrics/Gynecology   • BREAST SURGERY Right     biopsy   • DENTAL PROCEDURE     • HYSTERECTOMY     • SALPINGO OOPHORECTOMY Bilateral 6/19/2018    Procedure: LAPAROSCOPIC LEFT OOPHORECTOMY, RIGHT SALPINGO OOPHERECTOMY, CAUTERIZATION OF ENDOMETRIOSIS;  Surgeon: Armando Johnson MD;  Location: [facility];  Service: Obstetrics/Gynecology       Family History   Problem Relation Age of Onset   • Cancer Mother    • COPD Mother    • COPD Father    • Diabetes Sister    • Cancer Maternal Grandmother        Social History     Socioeconomic History   • Marital status: Legally      Spouse name: Not on file   • Number of children: Not on file   • Years of education: Not on file   • Highest education level: Not on file   Tobacco Use   • Smoking status: Current Every Day Smoker     Packs/day: 0.50     Years: 15.00     Pack years: 7.50     Types:  Cigarettes   • Smokeless tobacco: Never Used   Substance and Sexual Activity   • Alcohol use: No   • Drug use: No   • Sexual activity: Defer     Birth control/protection: Surgical           Objective   Physical Exam  Vitals and nursing note reviewed.   Constitutional:       General: She is not in acute distress.     Appearance: Normal appearance. She is normal weight. She is not ill-appearing, toxic-appearing or diaphoretic.   HENT:      Head: Normocephalic and atraumatic.      Right Ear: Tympanic membrane, ear canal and external ear normal. There is no impacted cerumen.      Left Ear: Tympanic membrane, ear canal and external ear normal. There is no impacted cerumen.      Nose: Nose normal. No congestion or rhinorrhea.      Mouth/Throat:      Mouth: Mucous membranes are moist.      Pharynx: Oropharynx is clear. No oropharyngeal exudate or posterior oropharyngeal erythema.   Eyes:      General: No scleral icterus.        Right eye: No discharge.         Left eye: No discharge.      Extraocular Movements: Extraocular movements intact.      Conjunctiva/sclera: Conjunctivae normal.      Pupils: Pupils are equal, round, and reactive to light.   Neck:      Vascular: No carotid bruit.   Cardiovascular:      Rate and Rhythm: Normal rate and regular rhythm.      Pulses: Normal pulses.      Heart sounds: Normal heart sounds. No murmur heard.   No friction rub. No gallop.    Pulmonary:      Effort: Pulmonary effort is normal. No respiratory distress.      Breath sounds: Normal breath sounds. No stridor. No wheezing, rhonchi or rales.   Chest:      Chest wall: No tenderness.   Abdominal:      General: Abdomen is flat. Bowel sounds are normal. There is no distension.      Palpations: Abdomen is soft. There is no mass.      Tenderness: There is no abdominal tenderness. There is no right CVA tenderness, left CVA tenderness, guarding or rebound.      Hernia: No hernia is present.   Musculoskeletal:         General: No swelling,  tenderness, deformity or signs of injury. Normal range of motion.      Cervical back: Normal range of motion and neck supple. No rigidity or tenderness.      Right lower leg: No edema.      Left lower leg: No edema.   Lymphadenopathy:      Cervical: No cervical adenopathy.   Skin:     General: Skin is warm and dry.      Coloration: Skin is not jaundiced or pale.      Findings: No bruising, erythema, lesion or rash.   Neurological:      General: No focal deficit present.      Mental Status: She is alert and oriented to person, place, and time. Mental status is at baseline.      Cranial Nerves: No cranial nerve deficit.      Sensory: No sensory deficit.      Motor: No weakness.      Coordination: Coordination normal.      Gait: Gait normal.      Deep Tendon Reflexes: Reflexes normal.   Psychiatric:         Mood and Affect: Mood normal.         Behavior: Behavior normal.         Thought Content: Thought content normal.         Judgment: Judgment normal.         Procedures           ED Course  ED Course as of Sep 14 1546   Tue Sep 14, 2021   1349 EKG NSR 87 bpm, , QRS 92, QTc 438, regular axis, poor R wave progression.  No prior EKG for comparison.    []   1509 IMPRESSION:  No evidence of active or acute cardiopulmonary disease on today's chest  radiograph.       []   1537 IMPRESSION:  1. No evidence of a pulmonary embolus  2. Small cavitary lesion measuring 1.33 cm posteriorly in the left lung.  Minimal airspace density in the right lower lobe..    []   1537 Discussed care with patient.  Will be discharged home.    []      ED Course User Index  [] Vasiliy Ward PA-C  [] Nic Sanford MD                                           [facility]    Final diagnoses:   Pneumonia due to COVID-19 virus   Pulmonary lesion       ED Disposition  ED Disposition     ED Disposition Condition Comment    Discharge [place] [id number]  [phone]  Call in 1  day           Medication List      No changes were made to your prescriptions during this visit.          Vasiliy Ward PA-C  09/14/21 [id number]

## 2021-09-15 LAB
PROCALCITONIN SERPL-MCNC: 0.04 NG/ML (ref 0–0.25)
QT INTERVAL: 364 MS
QTC INTERVAL: 438 MS

## 2022-03-16 ENCOUNTER — HOSPITAL ENCOUNTER (OUTPATIENT)
Dept: MAMMOGRAPHY | Facility: HOSPITAL | Age: 47
Discharge: HOME | End: 2022-03-16
Admitting: INTERNAL MEDICINE

## 2022-03-16 DIAGNOSIS — Z12.31 VISIT FOR SCREENING MAMMOGRAM: ICD-10-CM

## 2022-03-16 PROCEDURE — 77063 BREAST TOMOSYNTHESIS BI: CPT

## 2022-03-16 PROCEDURE — 77067 SCR MAMMO BI INCL CAD: CPT | Performed by: RADIOLOGY

## 2022-03-16 PROCEDURE — 77067 SCR MAMMO BI INCL CAD: CPT

## 2022-03-16 PROCEDURE — 77063 BREAST TOMOSYNTHESIS BI: CPT | Performed by: RADIOLOGY

## 2022-04-26 ENCOUNTER — HOSPITAL ENCOUNTER (OUTPATIENT)
Dept: MAMMOGRAPHY | Facility: HOSPITAL | Age: 47
Discharge: HOME | End: 2022-04-26
Admitting: RADIOLOGY

## 2022-04-26 DIAGNOSIS — R92.8 ABNORMAL MAMMOGRAM: ICD-10-CM

## 2022-04-26 PROCEDURE — 77062 BREAST TOMOSYNTHESIS BI: CPT | Performed by: RADIOLOGY

## 2022-04-26 PROCEDURE — 77066 DX MAMMO INCL CAD BI: CPT | Performed by: RADIOLOGY

## 2022-04-26 PROCEDURE — 77066 DX MAMMO INCL CAD BI: CPT

## 2022-04-26 PROCEDURE — G0279 TOMOSYNTHESIS, MAMMO: HCPCS

## 2022-07-28 DIAGNOSIS — M25.511 RIGHT SHOULDER PAIN, UNSPECIFIED CHRONICITY: Primary | ICD-10-CM

## (undated) DEVICE — ENDOPOUCH RETRIEVER SPECIMEN RETRIEVAL BAGS: Brand: ENDOPOUCH RETRIEVER

## (undated) DEVICE — [HIGH FLOW INSUFFLATOR,  DO NOT USE IF PACKAGE IS DAMAGED,  KEEP DRY,  KEEP AWAY FROM SUNLIGHT,  PROTECT FROM HEAT AND RADIOACTIVE SOURCES.]: Brand: PNEUMOSURE

## (undated) DEVICE — 2, DISPOSABLE SUCTION/IRRIGATOR WITH DISPOSABLE TIP: Brand: STRYKEFLOW

## (undated) DEVICE — COR GYN LAPAROSCOPY: Brand: MEDLINE INDUSTRIES, INC.

## (undated) DEVICE — BNDG ADHS CURAD FLX/FABRC 2X4IN STRL LF

## (undated) DEVICE — ENDOPATH XCEL BLADELESS TROCARS WITH STABILITY SLEEVES: Brand: ENDOPATH XCEL

## (undated) DEVICE — ADHS LIQ MASTISOL 2/3ML

## (undated) DEVICE — COR MINOR LITHOTOMY: Brand: MEDLINE INDUSTRIES, INC.

## (undated) DEVICE — FRCP DISSCT LYONS W 9PIN 5X33

## (undated) DEVICE — HARMONIC ACE +7 LAPAROSCOPIC SHEARS ADVANCED HEMOSTASIS 5MM DIAMETER 36CM SHAFT LENGTH  FOR USE WITH GRAY HAND PIECE ONLY: Brand: HARMONIC ACE

## (undated) DEVICE — 3M™ STERI-STRIP™ REINFORCED ADHESIVE SKIN CLOSURES, R1547, 1/2 IN X 4 IN (12 MM X 100 MM), 6 STRIPS/ENVELOPE: Brand: 3M™ STERI-STRIP™

## (undated) DEVICE — ENDOPATH XCEL UNIVERSAL TROCAR STABLILITY SLEEVES: Brand: ENDOPATH XCEL

## (undated) DEVICE — PENCL E/S HNDSWCH PUSHBTN HOLSTR 10FT

## (undated) DEVICE — SUT VIC 0/0 UR6 27IN DYED J603H

## (undated) DEVICE — SYS CLOSE PORTII CARTR/THOMASN XL

## (undated) DEVICE — ENCORE® LATEX MICRO SIZE 7.5, STERILE LATEX POWDER-FREE SURGICAL GLOVE: Brand: ENCORE

## (undated) DEVICE — SUCTION CANISTER, 1500CC, RIGID: Brand: DEROYAL

## (undated) DEVICE — PAD SANI MAXI W/ADHS SNG WRP 11IN